# Patient Record
Sex: MALE | Race: WHITE
[De-identification: names, ages, dates, MRNs, and addresses within clinical notes are randomized per-mention and may not be internally consistent; named-entity substitution may affect disease eponyms.]

---

## 2019-03-10 ENCOUNTER — HOSPITAL ENCOUNTER (EMERGENCY)
Dept: HOSPITAL 80 - FED | Age: 19
Discharge: HOME | End: 2019-03-10
Payer: COMMERCIAL

## 2019-03-10 VITALS — DIASTOLIC BLOOD PRESSURE: 77 MMHG | SYSTOLIC BLOOD PRESSURE: 137 MMHG

## 2019-03-10 DIAGNOSIS — E86.9: ICD-10-CM

## 2019-03-10 DIAGNOSIS — L42: ICD-10-CM

## 2019-03-10 DIAGNOSIS — T78.40XA: Primary | ICD-10-CM

## 2019-03-10 NOTE — EDPHY
H & P


Time Seen by Provider: 03/10/19 16:45


HPI/ROS: 





CHIEF COMPLAINT:  Shortness of breath, itchy rash





HISTORY OF PRESENT ILLNESS:  18-year-old male presents with shortness of 

breath.  Onset of shortness of breath, lip swelling and increasing rash 2 hr 

ago.  Associated with anxiety and tingling in his mouth and hands.  Diagnosed 

with pityriasis rosea yesterday.  Had a herald patch 1 week ago, followed by a 

diffuse rash.  No prior history of allergic reaction and no new foods





REVIEW OF SYSTEMS:  complete 10 point ROS reviewed and is negative except for 

the noted elements in the HPI





Source: Patient





- Physical Exam


Exam: 





General Appearance:  Alert, pleasant, anxious


Eyes:  Pupils equal and round, no conjunctival pallor or injection


ENT, Mouth:  Mucous membranes moist, no oral swelling


Neck:  Normal inspection, no stridor


Respiratory:  Lungs are clear to auscultation, no wheezing


Cardiovascular:  Regular rate and rhythm


Gastrointestinal:  Abdomen is soft and nontender


Neurological:  A&O, nonfocal, normal gait


Skin:  Warm and dry, diffuse maculopapular rash


Extremities:  No swelling


Psychiatric:  Anxious





Allergies/Adverse Reactions: 


 





No Known Allergies Allergy (Unverified 03/10/19 18:21)


 








Home Medications: 














 Medication  Instructions  Recorded


 


EPINEPHrine [Epipen 0.3 MG] 0.3 mg IM ONCE #2 syr 03/10/19


 


Pepcid  03/10/19


 


Prednisone  03/10/19


 


predniSONE 40 mg PO DAILY #6 tab 03/10/19














Medical Decision Making


ED Course/Re-evaluation: 





This patient presents with shortness of breath, lip swelling and diffuse rash.  

Physical exam is unremarkable except for the rash and associated anxiety.  

Concern for anaphylaxis.  Possibly secondary to a panic attack however.  

Epinephrine IM, Solu-Medrol, Benadryl and ranitidine IV given.


6:15 p.m.:  Feels much better, rash has subsided.  No oral swelling.  Lungs are 

clear to auscultation.  Likely allergic reaction.  Discharge instructions given.





Differential Diagnosis: 





Differential diagnosis includes though it is not limited to laryngeal edema, 

bronchospasm, hypotension, angioedema.





- Data Points


Medications Given: 


 








Discontinued Medications





Diphenhydramine HCl (Benadryl Injection)  25 mg IVP EDNOW ONE


   Stop: 03/10/19 16:45


   Last Admin: 03/10/19 16:57 Dose:  25 mg


Epinephrine HCl (Epinephrine)  0.3 mg IM EDNOW ONE


   Stop: 03/10/19 16:45


   Last Admin: 03/10/19 16:54 Dose:  0.3 mg


Sodium Chloride (Ns)  1,000 mls @ 0 mls/hr IV ONCE ONE; Wide Open


   PRN Reason: Protocol


   Stop: 03/10/19 16:45


   Last Admin: 03/10/19 16:54 Dose:  1,000 mls


Methylprednisolone Sodium Succinate (Solu-Medrol)  125 mg IVP EDNOW ONE


   Stop: 03/10/19 16:45


   Last Admin: 03/10/19 16:56 Dose:  125 mg


Ranitidine HCl (Zantac)  50 mg IVP EDNOW ONE


   Stop: 03/10/19 16:45


   Last Admin: 03/10/19 16:59 Dose:  50 mg








Departure





- Departure


Disposition: Home, Routine, Self-Care


Clinical Impression: 


Allergic reaction


Qualifiers:


 Encounter type: initial encounter Qualified Code(s): T78.40XA - Allergy, 

unspecified, initial encounter





Condition: Good


Instructions:  Anaphylaxis (ED)


Additional Instructions: 


Take Claritin in the morning and Benadryl at night while the rash persists.


Take prednisone as prescribed.


Return for worsening symptoms or any concerns.





Referrals: 


Sarah Torres MD [[facility] Primary Care Provider] - As per Instructions


Prescriptions: 


EPINEPHrine [Epipen 0.3 MG] 0.3 mg IM ONCE #2 syr


predniSONE 40 mg PO DAILY #6 tab

## 2021-05-28 ENCOUNTER — OFFICE VISIT (OUTPATIENT)
Dept: FAMILY MEDICINE CLINIC | Facility: CLINIC | Age: 21
End: 2021-05-28
Payer: COMMERCIAL

## 2021-05-28 VITALS
TEMPERATURE: 98 F | DIASTOLIC BLOOD PRESSURE: 60 MMHG | RESPIRATION RATE: 16 BRPM | HEIGHT: 69 IN | WEIGHT: 145 LBS | BODY MASS INDEX: 21.48 KG/M2 | HEART RATE: 77 BPM | SYSTOLIC BLOOD PRESSURE: 100 MMHG | OXYGEN SATURATION: 100 %

## 2021-05-28 DIAGNOSIS — J02.9 SORE THROAT: ICD-10-CM

## 2021-05-28 DIAGNOSIS — Z20.822 ENCOUNTER FOR LABORATORY TESTING FOR COVID-19 VIRUS: ICD-10-CM

## 2021-05-28 DIAGNOSIS — J02.0 STREP THROAT: Primary | ICD-10-CM

## 2021-05-28 PROCEDURE — 3008F BODY MASS INDEX DOCD: CPT | Performed by: NURSE PRACTITIONER

## 2021-05-28 PROCEDURE — 99203 OFFICE O/P NEW LOW 30 MIN: CPT | Performed by: NURSE PRACTITIONER

## 2021-05-28 PROCEDURE — 3074F SYST BP LT 130 MM HG: CPT | Performed by: NURSE PRACTITIONER

## 2021-05-28 PROCEDURE — 3078F DIAST BP <80 MM HG: CPT | Performed by: NURSE PRACTITIONER

## 2021-05-28 PROCEDURE — 87880 STREP A ASSAY W/OPTIC: CPT | Performed by: NURSE PRACTITIONER

## 2021-05-28 RX ORDER — AMOXICILLIN 875 MG/1
875 TABLET, COATED ORAL 2 TIMES DAILY
Qty: 20 TABLET | Refills: 0 | Status: SHIPPED | OUTPATIENT
Start: 2021-05-28 | End: 2021-06-07

## 2021-05-28 NOTE — PROGRESS NOTES
CHIEF COMPLAINT:   Patient presents with:  Sore Throat: x 9 days      HPI:   Lynda Kendrick is a 23year old male presents to clinic with symptoms of sore throat. Patient has had for 8 days. Symptoms have been worsening since onset.   Patient reports follo good BS's,no masses, hepatosplenomegaly, or tenderness on direct palpation  EXTREMITIES: no cyanosis, clubbing or edema  LYMPH: pos anterior cervical. pos submandibular lymphadenopathy. No posterior cervical or occipital lymphadenopathy.     Recent Results with swallowing, aching all over, headache, swollen lymph nodes at the front of the neck, and red swollen tonsils sometimes with white patches and fever. It's treated with antibiotic medicine. This should help you start to feel better in 1 to 2 days.    Beth can't open your mouth wide because of throat pain  · Signs of dehydration. These include very dark urine or no urine, sunken eyes, and dizziness.   · Noisy breathing  · Muffled voice  · Rash  Call 911  Call 911right away if you:   · Have trouble breathing

## 2024-06-21 ENCOUNTER — APPOINTMENT (RX ONLY)
Dept: URBAN - METROPOLITAN AREA CLINIC 302 | Facility: CLINIC | Age: 24
Setting detail: DERMATOLOGY
End: 2024-06-21

## 2024-06-21 DIAGNOSIS — L82.1 OTHER SEBORRHEIC KERATOSIS: ICD-10-CM

## 2024-06-21 DIAGNOSIS — L81.4 OTHER MELANIN HYPERPIGMENTATION: ICD-10-CM

## 2024-06-21 DIAGNOSIS — D485 NEOPLASM OF UNCERTAIN BEHAVIOR OF SKIN: ICD-10-CM | Status: INADEQUATELY CONTROLLED

## 2024-06-21 DIAGNOSIS — D22 MELANOCYTIC NEVI: ICD-10-CM

## 2024-06-21 DIAGNOSIS — D18.0 HEMANGIOMA: ICD-10-CM

## 2024-06-21 PROBLEM — D18.01 HEMANGIOMA OF SKIN AND SUBCUTANEOUS TISSUE: Status: ACTIVE | Noted: 2024-06-21

## 2024-06-21 PROBLEM — D22.5 MELANOCYTIC NEVI OF TRUNK: Status: ACTIVE | Noted: 2024-06-21

## 2024-06-21 PROBLEM — D48.5 NEOPLASM OF UNCERTAIN BEHAVIOR OF SKIN: Status: ACTIVE | Noted: 2024-06-21

## 2024-06-21 PROCEDURE — ? COUNSELING

## 2024-06-21 PROCEDURE — ? BIOPSY BY SHAVE METHOD

## 2024-06-21 PROCEDURE — 11102 TANGNTL BX SKIN SINGLE LES: CPT

## 2024-06-21 PROCEDURE — ? FULL BODY SKIN EXAM

## 2024-06-21 PROCEDURE — ? TREATMENT REGIMEN

## 2024-06-21 PROCEDURE — 99203 OFFICE O/P NEW LOW 30 MIN: CPT | Mod: 25

## 2024-06-21 PROCEDURE — 11103 TANGNTL BX SKIN EA SEP/ADDL: CPT

## 2024-06-21 ASSESSMENT — LOCATION SIMPLE DESCRIPTION DERM
LOCATION SIMPLE: RIGHT UPPER ARM
LOCATION SIMPLE: UPPER BACK
LOCATION SIMPLE: CHEST
LOCATION SIMPLE: LEFT UPPER ARM
LOCATION SIMPLE: LEFT LOWER BACK
LOCATION SIMPLE: ABDOMEN
LOCATION SIMPLE: RIGHT UPPER BACK
LOCATION SIMPLE: LEFT CHEEK
LOCATION SIMPLE: LEFT FOREARM
LOCATION SIMPLE: LEFT UPPER BACK

## 2024-06-21 ASSESSMENT — LOCATION DETAILED DESCRIPTION DERM
LOCATION DETAILED: LEFT SUPERIOR LATERAL MALAR CHEEK
LOCATION DETAILED: MIDDLE STERNUM
LOCATION DETAILED: SUPERIOR THORACIC SPINE
LOCATION DETAILED: LEFT INFERIOR MEDIAL UPPER BACK
LOCATION DETAILED: RIGHT MID-UPPER BACK
LOCATION DETAILED: LEFT SUPERIOR LATERAL UPPER BACK
LOCATION DETAILED: LEFT INFERIOR LATERAL MIDBACK
LOCATION DETAILED: RIGHT ANTERIOR DISTAL UPPER ARM
LOCATION DETAILED: RIGHT LATERAL ABDOMEN
LOCATION DETAILED: LEFT ANTERIOR PROXIMAL UPPER ARM
LOCATION DETAILED: LEFT VENTRAL PROXIMAL FOREARM

## 2024-06-21 ASSESSMENT — LOCATION ZONE DERM
LOCATION ZONE: FACE
LOCATION ZONE: TRUNK
LOCATION ZONE: ARM

## 2024-06-21 NOTE — HPI: EVALUATION OF SKIN LESION(S)
Hpi Title: Evaluation of Skin Lesions
Additional History: FBE \\nPatient concerned about mole on leg. Change in color and growing.